# Patient Record
Sex: MALE | Race: WHITE | NOT HISPANIC OR LATINO | Employment: FULL TIME | URBAN - METROPOLITAN AREA
[De-identification: names, ages, dates, MRNs, and addresses within clinical notes are randomized per-mention and may not be internally consistent; named-entity substitution may affect disease eponyms.]

---

## 2019-10-14 ENCOUNTER — OCCUPATIONAL MEDICINE (OUTPATIENT)
Dept: URGENT CARE | Facility: CLINIC | Age: 54
End: 2019-10-14
Payer: COMMERCIAL

## 2019-10-14 ENCOUNTER — APPOINTMENT (OUTPATIENT)
Dept: RADIOLOGY | Facility: IMAGING CENTER | Age: 54
End: 2019-10-14
Attending: PHYSICIAN ASSISTANT
Payer: COMMERCIAL

## 2019-10-14 VITALS
SYSTOLIC BLOOD PRESSURE: 132 MMHG | DIASTOLIC BLOOD PRESSURE: 84 MMHG | BODY MASS INDEX: 35.88 KG/M2 | HEART RATE: 81 BPM | RESPIRATION RATE: 18 BRPM | HEIGHT: 62 IN | WEIGHT: 195 LBS | OXYGEN SATURATION: 93 % | TEMPERATURE: 97.5 F

## 2019-10-14 DIAGNOSIS — S82.832A CLOSED FRACTURE OF DISTAL END OF LEFT FIBULA, UNSPECIFIED FRACTURE MORPHOLOGY, INITIAL ENCOUNTER: ICD-10-CM

## 2019-10-14 DIAGNOSIS — Z02.1 PRE-EMPLOYMENT DRUG SCREENING: ICD-10-CM
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
## 2019-10-14 DIAGNOSIS — S89.92XA LOWER LEG INJURY, LEFT, INITIAL ENCOUNTER: ICD-10-CM

## 2019-10-14 LAB
AMP AMPHETAMINE: NORMAL
BREATH ALCOHOL COMMENT: NORMAL
COC COCAINE: NORMAL
INT CON NEG: NORMAL
INT CON POS: NORMAL
MET METHAMPHETAMINES: NORMAL
OPI OPIATES: NORMAL
PCP PHENCYCLIDINE: NORMAL
POC BREATHALIZER: 0 PERCENT (ref 0–0.01)
POC DRUG COMMENT 753798-OCCUPATIONAL HEALTH: NEGATIVE
THC: NORMAL

## 2019-10-14 PROCEDURE — 73590 X-RAY EXAM OF LOWER LEG: CPT | Mod: TC,LT,29 | Performed by: PHYSICIAN ASSISTANT

## 2019-10-14 PROCEDURE — 73610 X-RAY EXAM OF ANKLE: CPT | Mod: TC,LT,29 | Performed by: PHYSICIAN ASSISTANT

## 2019-10-14 PROCEDURE — 99203 OFFICE O/P NEW LOW 30 MIN: CPT | Mod: 29 | Performed by: PHYSICIAN ASSISTANT

## 2019-10-14 PROCEDURE — 80305 DRUG TEST PRSMV DIR OPT OBS: CPT | Performed by: PHYSICIAN ASSISTANT

## 2019-10-14 PROCEDURE — 82075 ASSAY OF BREATH ETHANOL: CPT | Performed by: PHYSICIAN ASSISTANT

## 2019-10-14 ASSESSMENT — ENCOUNTER SYMPTOMS
HEADACHES: 0
WEAKNESS: 0
BRUISES/BLEEDS EASILY: 0
DOUBLE VISION: 0
CHILLS: 0
NECK PAIN: 0
FALLS: 1
BACK PAIN: 0
CLAUDICATION: 0
DIZZINESS: 0
BLURRED VISION: 0
LOSS OF CONSCIOUSNESS: 0
ABDOMINAL PAIN: 0
FEVER: 0
SPUTUM PRODUCTION: 0
TINGLING: 0
SENSORY CHANGE: 0
VOMITING: 0
FLANK PAIN: 0

## 2019-10-14 NOTE — LETTER
[place] [address] [facility] [address] [address]  Phone:  [phone] - Fax:  [phone]   [facility] Progress Report and Disability Certification  Date of Service: 10/14/2019   No Show:  No  Date / Time of Next Visit: 10/18/2019@9:30 AM   Claim Information   Patient Name: Henri Bowens  Claim Number:     Employer: [employer]  Date of Injury: 10/14/2019     Insurer / TPA: Rosa Maria Chacon  ID / SSN:     Occupation:   Diagnosis: The primary encounter diagnosis was Work related injury. Diagnoses of Closed fracture of distal end of left fibula, unspecified fracture morphology, initial encounter and Pre-employment drug screening were also pertinent to this visit.    Medical Information   Related to Industrial Injury? Yes    Subjective Complaints:  DOI: 10/12/2019  Patient reports he was walking to restroom and slipped on ice while at work early Saturday morning.  Positive mechanical ground-level fall, patient reports he fell onto his left ankle, he felt immediate pain and swelling prior to injury.  No deformity or changes in sensation. Pain is worse with ambulation. Patient is ambulating with walker that he borrowed from a friend.  Patient has not tried any OTC medications for pain.     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  A&O by 4  MSK: Left lower extremity: Moderate edema to left lower extremity extending to the level of the calf.  Mild diffuse ecchymosis over lateral aspect of foot.  Moderate bony tenderness over lateral aspect of the left distal fibula and lateral malleolus.  Cap  refill is less than 2.  Sensation is intact.  Distal pulses are intact.  Patient unable to bear weight secondary to pain.  Positive pain with range of motion with flexion, extension, and lateral/medial movements.   Pre-Existing Condition(s): History of previous left tibia fracture with proximal and distal plates in 1983   Assessment:   Initial Visit    Status: Discharged / Care Transfer  Comments:Urgent referral to orthopedics  Permanent Disability:No    Plan: Transfer Care    Diagnostics: X-ray  Comments:distal left fibula fracture     Comments:       Disability Information   Status: Released to Restricted Duty    From:  10/14/2019  Through: 10/18/2019 Restrictions are: Temporary   Physical Restrictions   Sitting:    Standing:    Stooping:    Bending:      Squatting:    Walking:    Climbing:    Pushing:      Pulling:    Other:    Reaching Above Shoulder (L):   Reaching Above Shoulder (R):       Reaching Below Shoulder (L):    Reaching Below Shoulder (R):      Not to exceed Weight Limits   Carrying(hrs):   Weight Limit(lb):   Lifting(hrs):   Weight  Limit(lb):     Comments: Patient should be nonweightbearing on crutches and in long boot until follow-up with orthopedics.     Repetitive Actions   Hands: i.e. Fine Manipulations from Grasping:     Feet: i.e. Operating Foot Controls:     Driving / Operate Machinery:     Physician Name: Marilee Bella P.A.-C. Physician Signature: MARILEE Waddell P.A.-C. e-Signature: Dr. Jarvis Calderón, Medical Director   Clinic Name / Location: [address] [address] Clinic Phone Number: Dept: [phone]   Appointment Time: 3:30 Pm Visit Start Time: 4:03 PM   Check-In Time:  3:43 Pm Visit Discharge Time: 6:35 PM   Original-Treating Physician or Chiropractor    Page 2-Insurer/TPA    Page 3-Employer    Page 4-Employee

## 2019-10-14 NOTE — LETTER
[facility] [address] [address]  Phone:  [phone] - Fax:  [phone]   [facility] Progress Report and Disability Certification  Date of Service: 10/14/2019   No Show:  No  Date / Time of Next Visit: 10/18/2019   Claim Information   Patient Name: Henri Bowens  Claim Number:     Employer: [employer] *** Date of Injury: 10/14/2019     Insurer / TPA: Rosa Maria Chacon *** ID / SSN:     Occupation:  *** Diagnosis: The encounter diagnosis was Closed fracture of distal end of left fibula, unspecified fracture morphology, initial encounter.    Medical Information   Related to Industrial Injury? Yes ***   Subjective Complaints:  DOI: 10/12/2019  Patient reports he was walking to restroom and slipped on ice, causing him to fall    Patient is ambulating with walker that he borrowed from a friend. Pain is worse ambulation.   Patient has not tried any OTC medications for pain.     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  A&O by 4  MSK: Left lower extremity: Moderate edema to left lower extremity extending to the level of the calf.  Mild diffuse ecchymosis over lateral aspect of foot.  Moderate bony tenderness over lateral aspect of the left distal fibula and lateral malleolus.  Cap refill is less than 2.  Sensation is intact.  Distal pulses are intact.  Patient unable to bear weight secondary to pain.  Positive pain with range of motion with flexion, extension, and lateral/medial movements.   Pre-Existing Condition(s): History of previous left tibia fracture with proximal and  distal plates in 1983   Assessment:   Initial Visit    Status: Discharged / Care Transfer  Comments:Urgent referral to orthopedics  Permanent Disability:No    Plan: Transfer Care    Diagnostics: X-ray  Comments:distal left fibula fracture     Comments:       Disability Information   Status: Released to Restricted Duty    From:  10/14/2019  Through: 10/18/2019 Restrictions are: Temporary   Physical Restrictions   Sitting:    Standing:    Stooping:    Bending:      Squatting:    Walking:    Climbing:    Pushing:      Pulling:    Other:    Reaching Above Shoulder (L):   Reaching Above Shoulder (R):       Reaching Below Shoulder (L):    Reaching Below Shoulder (R):      Not to exceed Weight Limits   Carrying(hrs):   Weight Limit(lb):   Lifting(hrs):   Weight  Limit(lb):     Comments: Patient should be nonweightbearing on crutches and in long boot until follow-up with orthopedics.     Repetitive Actions   Hands: i.e. Fine Manipulations from Grasping:     Feet: i.e. Operating Foot Controls:     Driving / Operate Machinery:     Physician Name: Marilee Bella P.A.-C. Physician Signature: MARILEE Waddell P.A.-C. e-Signature: Dr. Jarvis Calderón, Medical Director   Clinic Name / Location: [address] [place] [address], [place] [address] Clinic Phone Number: Dept: [phone]   Appointment Time: 3:30 Pm Visit Start Time: 4:03 PM   Check-In Time:  3:43 Pm Visit Discharge Time: 6:01 Pm ***   Original-Treating Physician or Chiropractor    Page 2-Insurer/TPA    Page 3-Employer    Page 4-Employee

## 2019-10-14 NOTE — LETTER
"EMPLOYEE’S CLAIM FOR COMPENSATION/ REPORT OF INITIAL TREATMENT  FORM C-4    EMPLOYEE’S CLAIM - PROVIDE ALL INFORMATION REQUESTED   First Name  Henri Last Name  Kwan Birthdate                    1965                Sex  male Claim Number   Home Address  [address] Age  54 y.o. Height  1.575 m (5' 2\") Weight  88.5 kg (195 lb) [facility]     [facility] Zip  [address] Telephone  [phone] (home)    Mailing Address  [address] [facility] [place] Zip  [address] Primary Language Spoken  English    Insurer  Unknown Third Party   Rosa Maria Chacon   Employee's Occupation (Job Title) When Injury or Occupational Disease Occurred      Employer's Name  [employer]  Telephone  [phone]    Employer Address  [address]  Zip  [address]    Date of Injury  10/14/2019               Hour of Injury  12:52 AM Date Employer Notified  10/14/2019 Last Day of Work after Injury or Occupational Disease  10/14/2019 Supervisor to Whom Injury Reported  Braxton Morel   Address or Location of Accident (if applicable)  [[facility]]   What were you doing at the time of accident? (if applicable)  I was walking back to work area, fell on ice    How did this injury or occupational disease occur? (Be specific an answer in detail. Use additional sheet if necessary)  I was walking back to my work place at the [employer] [employer], when i got too close to where  some ice was, fell hard on left foot.   If you believe that you have an occupational disease, when did you first have knowledge of the disability and it relationship to your employment?  n/a Witnesses to the Accident  n/a      Nature of Injury or Occupational Disease  Sprain  Part(s) of Body Injured or Affected  Foot (L), Ankle (L), N/A    I certify that the above is true and correct to the best of my " knowledge and that I have provided this information in order to obtain the benefits of [place]  I hereby authorize any physician, chiropractor, surgeon, practitioner, or other person, any hospital, including [facility] or [facility], any medical service organization, any insurance company, or other institution or organization to release to each other, any medical or other information, including benefits paid or payable, pertinent to this injury or disease, except information relative to diagnosis, treatment and/or counseling for AIDS, psychological conditions, alcohol or controlled substances, for which I must give specific authorization.  A Photostat of this authorization shall be as valid as the original.     Date   Place   Employee’s Signature   THIS REPORT MUST BE COMPLETED AND MAILED WITHIN 3 WORKING DAYS OF TREATMENT   Place  [facility]  Name of Facility  [facility]   Date  10/14/2019 Diagnosis  (S82.832A) Closed fracture of distal end of left fibula, unspecified fracture morphology, initial encounter Is there evidence the injured employee was under the influence of alcohol and/or another controlled substance at the time of accident?   Hour  4:03 PM Description of Injury or Disease  The encounter diagnosis was Closed fracture of distal end of left fibula, unspecified fracture morphology, initial encounter. No   Treatment  Urgent referral to orthopedics for further evaluation and medical treatment plan.  Patient given long boot and crutches prior to discharge.  Recommend patient apply ice to affected area and elevate above the level of the heart.  OTC ibuprofen and Tylenol for pain and swelling.  Patient should be nonweightbearing until follow-up with orthopedics.  Have you advised the patient to remain off work five days or more? No   X-Ray  "Findings  Positive  Comments:Positive for left distal fibular fracture   If Yes   From Date  To Date      From information given by the employee, together with medical evidence, can you directly connect this injury or occupational disease as job incurred?  Yes If No Full Duty  No Modified Duty  Yes   Is additional medical care by a physician indicated?  Yes  Comments:Referral to orthopedics If Modified Duty, Specify any Limitations / Restrictions  Patient is to perform desk job, recommend patient does not ambulate.  Patient is to remain on crutches and nonweightbearing.   Do you know of any previous injury or disease contributing to this condition or occupational disease?                            No   Date  10/14/2019 Print Doctor’s Name Marilee Bella P.A.-C. I certify the employer’s copy of  this form was mailed on:   Address  [address] [place] [address] Insurer’s Use Only     [facility]  [address]    Provider’s Tax ID Number  [id number] Telephone  Dept: [phone]        e-SignMORRMARILEE DE OLIVEIRA P.A.-C.   e-Signature: Dr. Jarvis Calderón, Medical Director Degree  MD        ORIGINAL-TREATING PHYSICIAN OR CHIROPRACTOR    PAGE 2-INSURER/TPA    PAGE 3-EMPLOYER    PAGE 4-EMPLOYEE             Form C-4 (rev.10/07)              BRIEF DESCRIPTION OF RIGHTS AND BENEFITS  (Pursuant to [place] 616[place])    Notice of Injury or Occupational Disease (Incident Report Form C-1): If an injury or occupational disease (OD) arises out of and in the course of employment, you must provide written notice to your employer as soon as practicable, but no later than 7 days after the accident or OD. Your employer shall maintain a sufficient supply of the required forms.    Claim for Compensation (Form C-4): If medical treatment is sought, the form C-4 is available at the place of initial treatment. A completed \"Claim for Compensation\" (Form C-4) must be filed within 90 days after an accident or OD. The treating physician or " chiropractor must, within 3 working days after treatment, complete and mail to the employer, the employer's insurer and third-party , the Claim for Compensation.    Medical Treatment: If you require medical treatment for your on-the-job injury or OD, you may be required to select a physician or chiropractor from a list provided by your workers’ compensation insurer, if it has contracted with an Organization for Managed Care (MCO) or Preferred Provider Organization (PPO) or providers of health care. If your employer has not entered into a contract with an MCO or PPO, you may select a physician or chiropractor from the Panel of Physicians and Chiropractors. Any medical costs related to your industrial injury or OD will be paid by your insurer.    Temporary Total Disability (TTD): If your doctor has certified that you are unable to work for a period of at least 5 consecutive days, or 5 cumulative days in a 20-day period, or places restrictions on you that your employer does not accommodate, you may be entitled to TTD compensation.    Temporary Partial Disability (TPD): If the wage you receive upon reemployment is less than the compensation for TTD to which you are entitled, the insurer may be required to pay you TPD compensation to make up the difference. TPD can only be paid for a maximum of 24 months.    Permanent Partial Disability (PPD): When your medical condition is stable and there is an indication of a PPD as a result of your injury or OD, within 30 days, your insurer must arrange for an evaluation by a rating physician or chiropractor to determine the degree of your PPD. The amount of your PPD award depends on the date of injury, the results of the PPD evaluation and your age and wage.    Permanent Total Disability (PTD): If you are medically certified by a treating physician or chiropractor as permanently and totally disabled and have been granted a PTD status by your insurer, you are entitled to  receive monthly benefits not to exceed 66 2/3% of your average monthly wage. The amount of your PTD payments is subject to reduction if you previously received a PPD award.    Vocational Rehabilitation Services: You may be eligible for vocational rehabilitation services if you are unable to return to the job due to a permanent physical impairment or permanent restrictions as a result of your injury or occupational disease.    Transportation and Per Miguelangel Reimbursement: You may be eligible for travel expenses and per miguelangel associated with medical treatment.    Reopening: You may be able to reopen your claim if your condition worsens after claim closure.    Appeal Process: If you disagree with a written determination issued by the insurer or the insurer does not respond to your request, you may appeal to the Department of Administration, , by following the instructions contained in your determination letter. You must appeal the determination within 70 days from the date of the determination letter at [address], [address], [place], [place] [address], or [address], [place] [address]. If you disagree with the  decision, you may appeal to the Department of Administration, . You must file your appeal within 30 days from the date of the  decision letter at [address], [address], [place], [place] [address], or [address], [address], [place], [place] [address]. If you disagree with a decision of an , you may file a petition for judicial review with the District Court. You must do so within 30 days of the Appeal Officer’s decision. You may be represented by an  at your own expense or you may contact the [facility] for possible representation.    [place]  for Injured Workers (NAIW): If you disagree with a  decision, you may request that NAIW represent you without charge at an   Hearing. For information regarding denial of benefits, you may contact the [facility] at: [address], [address], [place] [address], [phone]) [phone], or [address], [place] [address], [phone]    To File a Complaint with the Division: If you wish to file a complaint with the  of the Division of Industrial Relations (DIR),  please contact the Workers’ Compensation Section, [address], [address], [place], [place] [address], telephone [phone]) [phone], or [address], [address], [place], [place] [address], telephone [phone].    For assistance with Workers’ Compensation Issues: You may contact the Office of the Governor Consumer Health Assistance, [address], [place], [place] [address], Toll Free 1-716.584.1208, Web site: http://govcha.[facility].[place]., E-mail [email or web address].                             __________________________________________________________________                                                                   _________________                Employee Name / Signature                                                                                                                                                       Date                                                                                                                                                                                                     D-2 (rev. 06/18)

## 2019-10-15 NOTE — PROGRESS NOTES
"Subjective:      Henri Bowens is a 54 y.o. male who presents with Work-Related Injury (left foot/ankle injury )      DOI: 10/12/2019  Patient reports he was walking to restroom and slipped on ice while at work early Saturday morning.  Positive mechanical ground-level fall, patient reports he fell onto his left ankle, he felt immediate pain and swelling prior to injury.  No deformity or changes in sensation. Pain is worse with ambulation. Patient is ambulating with walker that he borrowed from a friend.  Patient has not tried any OTC medications for pain.     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   Eyes: Negative for blurred vision and double vision.   Respiratory: Negative for sputum production.    Cardiovascular: Positive for leg swelling. Negative for chest pain and claudication.   Gastrointestinal: Negative for abdominal pain and vomiting.   Genitourinary: Negative for flank pain.   Musculoskeletal: Positive for falls. Negative for back pain and neck pain.        Left lower extremity pain and swelling   Neurological: Negative for dizziness, tingling, sensory change, loss of consciousness, weakness and headaches.   Endo/Heme/Allergies: Does not bruise/bleed easily.          Objective:     /84   Pulse 81   Temp 36.4 °C (97.5 °F) (Temporal)   Resp 18   Ht 1.575 m (5' 2\")   Wt 88.5 kg (195 lb)   SpO2 93%   BMI 35.67 kg/m²      Physical Exam    Patient is generally well-appearing and in no acute distress.  A&O by 4  MSK: Left lower extremity: Moderate edema to left lower extremity extending to the level of the calf.  Mild diffuse ecchymosis " over lateral aspect of foot.  Moderate bony tenderness over lateral aspect of the left distal fibula and lateral malleolus.  Cap refill is less than 2.  Sensation is intact.  Distal pulses are intact.  Patient unable to bear weight secondary to pain.  Positive pain with range of motion with flexion, extension, and lateral/medial movements.       Assessment/Plan:     1. Closed fracture of distal end of left fibula, unspecified fracture morphology, initial encounter    - DX-TIBIA AND FIBULA LEFT; Future  - DX-ANKLE 3+ VIEWS LEFT; Future  - REFERRAL TO ORTHOPEDICS    2. Work related injury    - POCT Breath Alcohol Test  - POCT 6 Panel Urine Drug Screen    3. Pre-employment drug screening  DX left ankle:   Impression         1.  Distal left fibular fracture.    2.  Widening of the medial mortise is suggestive of deltoid ligament injury.     DX left tib/fib:   Impression       1.  Fracture in the distal fibula    2.  Status post ORIF of proximal and distal tibia fractures.    3.  Deformities in the proximal and distal are consistent with prior trauma     Urgent referral to orthopedics for further evaluation of distal left fibula fracture and medical treatment plan.  Possible deltoid ligament injury.  Patient given long boot and crutches prior to discharge.  Recommend patient apply ice to affected area and elevate above the level of the heart.  OTC ibuprofen and Tylenol for pain and swelling.  Patient should be nonweightbearing until follow-up with orthopedics.    See scanned workmen's comp forms

## 2019-10-24 ENCOUNTER — OCCUPATIONAL MEDICINE (OUTPATIENT)
Dept: OCCUPATIONAL MEDICINE | Facility: CLINIC | Age: 54
End: 2019-10-24
Payer: COMMERCIAL

## 2019-10-24 VITALS
HEART RATE: 97 BPM | BODY MASS INDEX: 35.88 KG/M2 | TEMPERATURE: 97.7 F | RESPIRATION RATE: 14 BRPM | OXYGEN SATURATION: 100 % | WEIGHT: 195 LBS | HEIGHT: 62 IN

## 2019-10-24 DIAGNOSIS — S82.832D OTHER CLOSED FRACTURE OF DISTAL END OF LEFT FIBULA WITH ROUTINE HEALING, SUBSEQUENT ENCOUNTER: ICD-10-CM

## 2019-10-24 PROCEDURE — 99203 OFFICE O/P NEW LOW 30 MIN: CPT | Performed by: PREVENTIVE MEDICINE

## 2019-10-24 ASSESSMENT — PAIN SCALES - GENERAL: PAINLEVEL: 7=MODERATE-SEVERE PAIN

## 2019-10-24 NOTE — PROGRESS NOTES
"Subjective:      Henri Bowens is a 54 y.o. male who presents with Follow-Up (WC DOI 10/12/2019 - Left Foot/Ankle - Better - RM 19)      DOI: 10/12/2019: 53 yo male presents with left ankle injury. Patient reports he was walking to restroom and slipped on ice while at work early Saturday morning.  Positive mechanical ground-level fall, patient reports he fell onto his left ankle. Seen in [facility], XR left ankle and tib/fib showed \"Fracture in the distal fibula. Status post ORIF of proximal and distal tibia fractures.\" He was referred to Ortho. Patient states pain and swelling still present. Denies any numbness/tingling. Did not know Ortho referral was approved. Pain worse with any prolonged standing/walking, improved with rest .     HPI    ROS    ROS: All systems were reviewed on intake form, form was reviewed and signed. See scanned documents in media. Pertinent positives and negatives included in HPI.    PMH: No pertinent past medical history to this problem  MEDS: Medications were reviewed in Epic  ALLERGIES: No Known Allergies  SOCHX: Works as a  at [employer]   FH: No pertinent family history to this problem   Objective:     Pulse 97   Temp 36.5 °C (97.7 °F) (Temporal)   Resp 14   Ht 1.575 m (5' 2\")   Wt 88.5 kg (195 lb)   SpO2 100%   BMI 35.67 kg/m²      Physical Exam   Constitutional: He is oriented to person, place, and time. He appears well-developed and well-nourished.   HENT:   Right Ear: External ear normal.   Left Ear: External ear normal.   Eyes: Conjunctivae and EOM are normal.   Cardiovascular: Normal rate.   Pulmonary/Chest: Effort normal.   Neurological: He is alert and oriented to person, place, and time.   Skin: Skin is warm and dry.   Psychiatric: He has a normal mood and affect. His behavior is normal. Judgment normal.       Left Ankle: Significant edema throughout lower extremity. Limited ROM. Diffuse TTP.        Assessment/Plan:     1. Other closed fracture of distal end of left " fibula with routine healing, subsequent encounter  Please excuse worker from 10/11 - 10/13 due to work place injury.  Continue walking boot  Make appointment with Ortho,   Transfer care to orthopedics  Restricted duty  Follow up as needed

## 2019-10-24 NOTE — LETTER
"[address],   [address] [place] [address]  Phone:  [phone] - Fax:  [phone]   [facility] Progress Report and Disability Certification  Date of Service: 10/24/2019   No Show:  No  Date / Time of Next Visit:  [facility]   Claim Information   Patient Name: Henri Bowens  Claim Number:     Employer: [employer]  Date of Injury: 10/14/2019     Insurer / TPA: Rosa Maria Chacon  ID / SSN:     Occupation:   Diagnosis: The encounter diagnosis was Other closed fracture of distal end of left fibula with routine healing, subsequent encounter.    Medical Information   Related to Industrial Injury? Yes    Subjective Complaints:  DOI: 10/12/2019: 53 yo male presents with left ankle injury. Patient reports he was walking to restroom and slipped on ice while at work early Saturday morning.  Positive mechanical ground-level fall, patient reports he fell onto his left ankle. Seen in [facility], XR left ankle and tib/fib showed \"Fracture in the distal fibula. Status post ORIF of proximal and distal tibia fractures.\" He was referred to Ortho. Patient states pain and swelling still present. Denies any numbness/tingling. Did not know Ortho referral was approved. Pain worse with any prolonged standing/walking, improved with rest .   Objective Findings: Left Ankle: Significant edema throughout lower extremity. Limited ROM. Diffuse TTP.    Pre-Existing Condition(s):     Assessment:   Condition Same    Status: Discharged / Care Transfer  Permanent Disability:No    Plan:      Diagnostics:      Comments:  Please excuse worker from 10/11 - 10/13 due to work place injury.  Continue walking boot  Make appointment with Ortho,   Transfer care to orthopedics  Restricted duty  Follow up as needed    Disability Information   Status: Released to Restricted Duty    From:  10/24/2019  Through:   Restrictions are: Temporary   Physical Restrictions "   Sitting:    Standing:    Stooping:    Bending:      Squatting:    Walking:    Climbing:    Pushing:      Pulling:    Other:    Reaching Above Shoulder (L):   Reaching Above Shoulder (R):       Reaching Below Shoulder (L):    Reaching Below Shoulder (R):      Not to exceed Weight Limits   Carrying(hrs):   Weight Limit(lb):   Lifting(hrs):   Weight  Limit(lb):     Comments: Sedentary/seated work only. Patient should be nonweightbearing on crutches and in long boot until follow-up with orthopedics.     Repetitive Actions   Hands: i.e. Fine Manipulations from Grasping:     Feet: i.e. Operating Foot Controls:     Driving / Operate Machinery:     Physician Name: Jw Mireles D.O. Physician Signature: [email or web address] D.O. e-Signature: Dr. Jarvis Calderón, Medical Director   Clinic Name / Location: [address],   [address] [place] [address] Clinic Phone Number: Dept: [phone]   Appointment Time: 1:00 Pm Visit Start Time: 10:56 AM   Check-In Time:  10:47 Am Visit Discharge Time:  11:26am   Original-Treating Physician or Chiropractor    Page 2-Insurer/TPA    Page 3-Employer    Page 4-Employee

## 2023-03-23 ENCOUNTER — NON-PROVIDER VISIT (OUTPATIENT)
Dept: OCCUPATIONAL MEDICINE | Facility: CLINIC | Age: 58
End: 2023-03-23

## 2023-03-23 DIAGNOSIS — Z02.1 PRE-EMPLOYMENT DRUG SCREENING: ICD-10-CM

## 2023-03-23 LAB
AMP AMPHETAMINE: NORMAL
COC COCAINE: NORMAL
INT CON NEG: NORMAL
INT CON POS: NORMAL
MET METHAMPHETAMINES: NORMAL
OPI OPIATES: NORMAL
PCP PHENCYCLIDINE: NORMAL
POC DRUG COMMENT 753798-OCCUPATIONAL HEALTH: NORMAL
THC: NORMAL

## 2023-03-23 PROCEDURE — 80305 DRUG TEST PRSMV DIR OPT OBS: CPT | Performed by: NURSE PRACTITIONER
